# Patient Record
Sex: MALE | Race: ASIAN | NOT HISPANIC OR LATINO | URBAN - METROPOLITAN AREA
[De-identification: names, ages, dates, MRNs, and addresses within clinical notes are randomized per-mention and may not be internally consistent; named-entity substitution may affect disease eponyms.]

---

## 2020-03-22 ENCOUNTER — EMERGENCY (EMERGENCY)
Facility: HOSPITAL | Age: 40
LOS: 1 days | Discharge: HOME | End: 2020-03-22
Attending: EMERGENCY MEDICINE | Admitting: EMERGENCY MEDICINE
Payer: SELF-PAY

## 2020-03-22 VITALS
TEMPERATURE: 98 F | RESPIRATION RATE: 16 BRPM | OXYGEN SATURATION: 99 % | SYSTOLIC BLOOD PRESSURE: 139 MMHG | DIASTOLIC BLOOD PRESSURE: 101 MMHG | HEART RATE: 88 BPM

## 2020-03-22 PROCEDURE — 99053 MED SERV 10PM-8AM 24 HR FAC: CPT

## 2020-03-22 PROCEDURE — 99283 EMERGENCY DEPT VISIT LOW MDM: CPT

## 2020-03-22 NOTE — ED ADULT NURSE REASSESSMENT NOTE - NS ED NURSE REASSESS COMMENT FT1
Pt resting comfortably.  Appears in NAD.  Respirations even and unlabored.  Awaiting eval.  Will continue to monitor.

## 2020-03-22 NOTE — ED PROVIDER NOTE - PATIENT PORTAL LINK FT
You can access the FollowMyHealth Patient Portal offered by [facility] by registering at the following website: [email or web address]. By joining [employer]’s FollowMyHealth portal, you will also be able to view your health information using other applications (apps) compatible with our system.

## 2020-03-22 NOTE — ED ADULT TRIAGE NOTE - CHIEF COMPLAINT QUOTE
pt comes to ed with fever x2 days, with no other symptoms. speaking in full sentences, breaths equal and non-labored b/l. no fever in ED took tylenol x30 min pta

## 2020-03-22 NOTE — ED PROVIDER NOTE - CLINICAL SUMMARY MEDICAL DECISION MAKING FREE TEXT BOX
39 y/o M presents with likely viral syndrome. Discussed testing for flu and COVID-19, but patient agrees testing is unnecessary at this time. Plan to discharge home with self quarantine for 14 days on presumed COVID.

## 2020-03-22 NOTE — ED PROVIDER NOTE - NSFOLLOWUPINSTRUCTIONS_ED_ALL_ED_FT
You were seen and evaluated for infection which may be COVID 19. Testing was done. We think you are safe for discharge and to follow up in a few days with your doctor by phone or in person.   You should treat fevers by taking advil or tylenol as needed.    You should stay hydrated and increase the amount of fluid you drink.  Return to the Emergency Department if your shortness of breath becomes worse, you become weak, or new symptoms develop.    You should monitor your temperature and quarantine yourself away from others - particularly the elderly, ill, or immunosuppressed - for the next 14 days, or until you find out that you do not have COVID19.    Should your COVID19 viral swab that was performed today result POSITIVE you will be contacted by phone at the number you provided when registered for this visit.  Your COVID19 test results will not result for up to 7 days.      DO NOT NOT CALL THE EMERGENCY DEPARTMENT FOR YOUR TEST RESULTS, you may call [phone].

## 2020-05-09 ENCOUNTER — EMERGENCY (EMERGENCY)
Facility: HOSPITAL | Age: 40
LOS: 1 days | Discharge: HOME | End: 2020-05-09
Attending: STUDENT IN AN ORGANIZED HEALTH CARE EDUCATION/TRAINING PROGRAM
Payer: SELF-PAY

## 2020-05-09 VITALS
RESPIRATION RATE: 18 BRPM | TEMPERATURE: 98 F | HEART RATE: 93 BPM | SYSTOLIC BLOOD PRESSURE: 134 MMHG | OXYGEN SATURATION: 98 % | DIASTOLIC BLOOD PRESSURE: 100 MMHG

## 2020-05-09 VITALS
DIASTOLIC BLOOD PRESSURE: 112 MMHG | HEART RATE: 100 BPM | RESPIRATION RATE: 15 BRPM | OXYGEN SATURATION: 97 % | SYSTOLIC BLOOD PRESSURE: 170 MMHG

## 2020-05-09 LAB
ALBUMIN SERPL ELPH-MCNC: 4.5 G/DL — SIGNIFICANT CHANGE UP (ref 3.3–5)
ALP SERPL-CCNC: 55 U/L — SIGNIFICANT CHANGE UP (ref 40–120)
ALT FLD-CCNC: 27 U/L — SIGNIFICANT CHANGE UP (ref 4–41)
ANION GAP SERPL CALC-SCNC: 14 MMO/L — SIGNIFICANT CHANGE UP (ref 7–14)
AST SERPL-CCNC: 24 U/L — SIGNIFICANT CHANGE UP (ref 4–40)
BASOPHILS # BLD AUTO: 0.05 K/UL — SIGNIFICANT CHANGE UP (ref 0–0.2)
BASOPHILS NFR BLD AUTO: 0.8 % — SIGNIFICANT CHANGE UP (ref 0–2)
BILIRUB SERPL-MCNC: 0.4 MG/DL — SIGNIFICANT CHANGE UP (ref 0.2–1.2)
BUN SERPL-MCNC: 15 MG/DL — SIGNIFICANT CHANGE UP (ref 7–23)
CALCIUM SERPL-MCNC: 9.8 MG/DL — SIGNIFICANT CHANGE UP (ref 8.4–10.5)
CHLORIDE SERPL-SCNC: 104 MMOL/L — SIGNIFICANT CHANGE UP (ref 98–107)
CO2 SERPL-SCNC: 23 MMOL/L — SIGNIFICANT CHANGE UP (ref 22–31)
CREAT SERPL-MCNC: 0.76 MG/DL — SIGNIFICANT CHANGE UP (ref 0.5–1.3)
EOSINOPHIL # BLD AUTO: 0.33 K/UL — SIGNIFICANT CHANGE UP (ref 0–0.5)
EOSINOPHIL NFR BLD AUTO: 5 % — SIGNIFICANT CHANGE UP (ref 0–6)
GLUCOSE SERPL-MCNC: 117 MG/DL — HIGH (ref 70–99)
HCT VFR BLD CALC: 40.4 % — SIGNIFICANT CHANGE UP (ref 39–50)
HGB BLD-MCNC: 13.7 G/DL — SIGNIFICANT CHANGE UP (ref 13–17)
IMM GRANULOCYTES NFR BLD AUTO: 0.2 % — SIGNIFICANT CHANGE UP (ref 0–1.5)
LYMPHOCYTES # BLD AUTO: 2.19 K/UL — SIGNIFICANT CHANGE UP (ref 1–3.3)
LYMPHOCYTES # BLD AUTO: 33.4 % — SIGNIFICANT CHANGE UP (ref 13–44)
MCHC RBC-ENTMCNC: 30.8 PG — SIGNIFICANT CHANGE UP (ref 27–34)
MCHC RBC-ENTMCNC: 33.9 % — SIGNIFICANT CHANGE UP (ref 32–36)
MCV RBC AUTO: 90.8 FL — SIGNIFICANT CHANGE UP (ref 80–100)
MONOCYTES # BLD AUTO: 0.86 K/UL — SIGNIFICANT CHANGE UP (ref 0–0.9)
MONOCYTES NFR BLD AUTO: 13.1 % — SIGNIFICANT CHANGE UP (ref 2–14)
NEUTROPHILS # BLD AUTO: 3.12 K/UL — SIGNIFICANT CHANGE UP (ref 1.8–7.4)
NEUTROPHILS NFR BLD AUTO: 47.5 % — SIGNIFICANT CHANGE UP (ref 43–77)
NRBC # FLD: 0 K/UL — SIGNIFICANT CHANGE UP (ref 0–0)
PLATELET # BLD AUTO: 223 K/UL — SIGNIFICANT CHANGE UP (ref 150–400)
PMV BLD: 10.3 FL — SIGNIFICANT CHANGE UP (ref 7–13)
POTASSIUM SERPL-MCNC: 4.1 MMOL/L — SIGNIFICANT CHANGE UP (ref 3.5–5.3)
POTASSIUM SERPL-SCNC: 4.1 MMOL/L — SIGNIFICANT CHANGE UP (ref 3.5–5.3)
PROT SERPL-MCNC: 8.1 G/DL — SIGNIFICANT CHANGE UP (ref 6–8.3)
RBC # BLD: 4.45 M/UL — SIGNIFICANT CHANGE UP (ref 4.2–5.8)
RBC # FLD: 12.3 % — SIGNIFICANT CHANGE UP (ref 10.3–14.5)
SODIUM SERPL-SCNC: 141 MMOL/L — SIGNIFICANT CHANGE UP (ref 135–145)
WBC # BLD: 6.56 K/UL — SIGNIFICANT CHANGE UP (ref 3.8–10.5)
WBC # FLD AUTO: 6.56 K/UL — SIGNIFICANT CHANGE UP (ref 3.8–10.5)

## 2020-05-09 PROCEDURE — 99283 EMERGENCY DEPT VISIT LOW MDM: CPT

## 2020-05-09 NOTE — ED PROVIDER NOTE - PROGRESS NOTE DETAILS
Dallin RICO: Patient's asymptomatic and feels well; lab work unremarkable.  Discussed follow-up and return precautions.

## 2020-05-09 NOTE — ED PROVIDER NOTE - NSFOLLOWUPCLINICS_GEN_ALL_ED_FT
[facility] General Internal Medicine  General Internal Medicine  [address] [id number]  Phone: [phone]  Fax:   Follow Up Time: Routine

## 2020-05-09 NOTE — ED ADULT TRIAGE NOTE - CHIEF COMPLAINT QUOTE
pt arrives w/ c/o fevers x 2 months. pt states his body feels hot and then cold. pt never measured temp with thermometer. pt afebrile at present time. pt denies any chest pain, nausea, vomiting, abdominal pain,

## 2020-05-09 NOTE — ED PROVIDER NOTE - CLINICAL SUMMARY MEDICAL DECISION MAKING FREE TEXT BOX
40 year-old male w/ no pertinent past medical history presents to the ED for fever/chills; patient afebrile in the ED; non-toxic and well-appearing.  No infectious signs/symptoms.  Symptoms likely secondary to environmental changes.  However, given persistent symptoms and prior ER visit; plan to do CBC. CMP to rule-out elevated WBC count/abnormalities concerning for underlying infection.

## 2020-05-09 NOTE — ED PROVIDER NOTE - PHYSICAL EXAMINATION
*Gen: NAD, AAO*3  *HEENT: NC/AT, MMM, airway patent, trachea midline  *CV: RRR, S1/S2 present, no murmurs/rubs  *Resp: no respiratory distress, LCTAB, no wheezing/rales  *Abd: non-distended, soft N/Tx4, no guarding or rigidity  *Neuro: no focal neuro deficits, moving all limbs appropriately  *Extremities: no gross deformity  *Skin: no rashes, no wounds   ~ Jason Zamarripa M.D.

## 2020-05-09 NOTE — ED PROVIDER NOTE - NSFOLLOWUPINSTRUCTIONS_ED_ALL_ED_FT
Follow-up with your Primary Care Physician within 1 - 3 days.  Please return to the Emergency Department immediately for any new, worsening or concerning symptoms.    Recommend buying a thermometer from a pharmacy and take your temperature to evaluate if you have a fever at home.    Copy of your lab work, imaging and/or other testing performed in the ER is attached along with your discharge paperwork.  Please take this to your Primary Care Physician for further discussion, evaluation and comparison with your prior blood work results.

## 2020-05-09 NOTE — ED PROVIDER NOTE - OBJECTIVE STATEMENT
40 year-old male w/ no pertinent past medical history presents to the ED for fever/chills.  Patient reports that for the past 2 months he has had occasional episodes of feeling warm and chills; has not taken his temperature at home as he does not have a thermometer.  No fevers, chills, nausea, vomiting, cough, chest pain, shortness of breath, abdominal pain, diarrhea, BIS.  Patient declines drug or alcohol use.  He was seen in the ED in March for similar symptoms and d/c home w/o blood work or testing.  Reports he lives in the basement - when the furnace is turned on he gets warm; when the window is left open he feels cold.  Patient felt a fever this evening and came to the ED.  Last took Tylenol in the early AM.

## 2020-05-09 NOTE — ED ADULT NURSE NOTE - OBJECTIVE STATEMENT
Sangeeta RN: 41 y/o M received to room 1 c/o hot flashes and chills. Pt a&ox4 and ambulatory. Pt states he has been having on and off of feeling hot and cold for 2 months. Pt denies cough, known fevers, sob, suggs, ha, cp, palpitations, travel, or sick contacts. Pt respirations even and unlabored with lung sounds clear bilaterally. Pt abdomen soft non-tender nondistended skin intact. Vital signs as noted, call bell in md tali evaluated, comfort measures provided, butterfly needle used to obtain blood work. Will give report to primary RN

## 2020-05-09 NOTE — ED PROVIDER NOTE - PATIENT PORTAL LINK FT
You can access the FollowMyHealth Patient Portal offered by [facility] by registering at the following website: [email or web address]. By joining [employer]’s FollowMyHealth portal, you will also be able to view your health information using other applications (apps) compatible with our system.

## 2020-05-09 NOTE — ED PROVIDER NOTE - ATTENDING CONTRIBUTION TO CARE
40 year-old male w/ no pertinent past medical history presents to the ED for fever/chills; patient afebrile in the ED; non-toxic and well-appearing.  No infectious signs/symptoms.  Symptoms likely secondary to environmental changes.  However, given persistent symptoms and prior ER visit; plan to do CBC. CMP to rule-out elevated WBC count/abnormalities concerning for underlying infection.    NADJA Traylor: I have personally performed a face to face bedside history and physical examination of this patient. I have discussed the history, examination, review of systems, assessment and plan of management with the resident. I have reviewed the electronic medical record and amended it to reflect my history, review of systems, physical exam, assessment and plan.

## 2020-05-10 PROBLEM — Z78.9 OTHER SPECIFIED HEALTH STATUS: Chronic | Status: ACTIVE | Noted: 2020-03-22

## 2023-06-09 NOTE — ED ADULT NURSE NOTE - PAIN RATING/NUMBER SCALE (0-10): REST
Tatum from the [facility] wants a call back from the nurse to discuss pt's test results . Tatum can be reached at  [phone] (Ok to leave a detailed message on  Phone)   0

## 2023-06-14 NOTE — ED PROVIDER NOTE - OBJECTIVE STATEMENT
Airway    Performed by: Bobo Oshea CRNA  Authorized by: Ha Darnell MD    Final Airway Type:  Endotracheal airway  Final Endotracheal Airway*:  ETT  ETT Size (mm)*:  7.5  Cuff*:  Regular  Technique Used for Successful ETT Placement:  Direct laryngoscopy  Devices/Methods Used in Placement*:  Mask and Mouth Guard  Intubation Procedure*:  Preoxygenation, ETCO2, Atraumatic, Dentition Unchanged and Pharynx Clear  Insertion Site:  Oral  Blade Type*:  Hollins  Blade Size*:  2  Measured from*:  Lips  Secured at (cm)*:  21  Placement Verified by: auscultation and capnometry    Glottic View*:  1 - full view of glottis  Attempts*:  1  Number of Other Approaches Attempted:  0   Patient Identified, Procedure confirmed, Emergency equipment available and Safety protocols followed  Location:  OR  Urgency:  Elective  Difficult Airway: No    Indications for Airway Management:  Anesthesia  Spontaneous Ventilation: absent    Sedation Level:  Anesthetized  Mask Difficulty Assessment:  2 - vent by mask + OA or adjuvant +/- NMBA  Start Time: 6/14/2023 2:18 PM       41 y/o M with no significant PMHx presents to ED with fever, body aches, and generalized fatigue x3 days. Denies having any cough, chest pain, or other medical complaints. Pt otherwise tolerating PO and drinking fluids. Of note patient has elevated BP in ED.

## 2024-08-22 NOTE — ED ADULT NURSE NOTE - NSFALLRSKASSESASSIST_ED_ALL_ED
1. Scapular dyskinesis (LEFT scapular protraction)  Referral to Pain Clinic      2. Degenerative cervical disc  Referral to Pain Clinic            8/18/2024 7:14 AM     HISTORY/REASON FOR EXAM:  LEFT cervical spine and periscapular pain        TECHNIQUE/EXAM DESCRIPTION:  MRI of the cervical spine without contrast.     Using a 1.5 T scanner an MRI examination of the cervical spine was performed, sequences as follows:  Sagittal T1, Sagittal T2, Sagittal STIR, Axial T2, Axial 3D gradient-echo.        CONTRAST:  None.     COMPARISON: None.     FINDINGS:     SKULL BASE: The cerebellar tonsils are in normal location without ectopia.     SPINAL CORD: The spinal cord demonstrates normal signal and caliber throughout.     VERTEBRAL BODIES AND DISC SPACES: Multilevel disc T2 signal loss. Mild multilevel disc height loss. Trace retrolisthesis of C6 on C7. Trace anterolisthesis of C7 on T1. There is no pathologic marrow signal.     PARASPINAL SOFT TISSUES: Unremarkable     LEVEL BY LEVEL ANALYSIS:     C2-C3: No significant spinal canal stenosis or neural foraminal narrowing.     C3-C4: Right predominant disc bulge. Effacement of the right lateral recess and partial effacement of the ventral thecal sac. Mild right neuroforaminal narrowing.     C4-C5: Central disc protrusion. Mild left facet and uncovertebral joint hypertrophy. Partial effacement of ventral thecal sac. Mild left neural foraminal narrowing.     C5-C6: Disc bulge. . Facet joint degenerative changes. Partial effacement of the ventral thecal sac. Moderate right and mild left neural foraminal narrowing.     C6-C7: Disc bulge. Facet joint degenerative changes. Minimal effacement of the ventral thecal sac. Severe right and mild-to-moderate left neural foraminal narrowing.     C7-T1: Facet joint degenerative changes. Severe right and mild-moderate left neuroforaminal narrowing.       IMPRESSION:     Multilevel degenerative changes as described level by level  above.  Trace degenerative listheses at C6-C7 and C7-T1.   no
